# Patient Record
Sex: MALE | Race: WHITE | Employment: UNEMPLOYED | URBAN - METROPOLITAN AREA
[De-identification: names, ages, dates, MRNs, and addresses within clinical notes are randomized per-mention and may not be internally consistent; named-entity substitution may affect disease eponyms.]

---

## 2021-07-28 PROCEDURE — 88304 TISSUE EXAM BY PATHOLOGIST: CPT | Mod: TC,ORL | Performed by: PHYSICIAN ASSISTANT

## 2021-07-30 ENCOUNTER — LAB REQUISITION (OUTPATIENT)
Dept: LAB | Facility: CLINIC | Age: 41
End: 2021-07-30
Payer: COMMERCIAL

## 2021-08-02 LAB
PATH REPORT.COMMENTS IMP SPEC: NORMAL
PATH REPORT.COMMENTS IMP SPEC: NORMAL
PATH REPORT.FINAL DX SPEC: NORMAL
PATH REPORT.GROSS SPEC: NORMAL
PATH REPORT.MICROSCOPIC SPEC OTHER STN: NORMAL
PATH REPORT.RELEVANT HX SPEC: NORMAL
PHOTO IMAGE: NORMAL

## 2021-08-02 PROCEDURE — 88304 TISSUE EXAM BY PATHOLOGIST: CPT | Mod: 26 | Performed by: PATHOLOGY

## 2021-08-06 ENCOUNTER — HOSPITAL ENCOUNTER (EMERGENCY)
Facility: CLINIC | Age: 41
Discharge: HOME | End: 2021-08-06
Attending: NURSE PRACTITIONER | Admitting: NURSE PRACTITIONER
Payer: COMMERCIAL

## 2021-08-06 VITALS
BODY MASS INDEX: 26.66 KG/M2 | RESPIRATION RATE: 17 BRPM | OXYGEN SATURATION: 97 % | TEMPERATURE: 98.5 F | HEIGHT: 65 IN | DIASTOLIC BLOOD PRESSURE: 75 MMHG | WEIGHT: 160 LBS | HEART RATE: 67 BPM | SYSTOLIC BLOOD PRESSURE: 120 MMHG

## 2021-08-06 DIAGNOSIS — Z98.890 S/P HEMORRHOIDECTOMY: ICD-10-CM

## 2021-08-06 DIAGNOSIS — Z87.19 S/P HEMORRHOIDECTOMY: ICD-10-CM

## 2021-08-06 PROCEDURE — 99282 EMERGENCY DEPT VISIT SF MDM: CPT

## 2021-08-06 ASSESSMENT — ENCOUNTER SYMPTOMS
FEVER: 0
WOUND: 1

## 2021-08-06 ASSESSMENT — MIFFLIN-ST. JEOR: SCORE: 1557.64

## 2021-08-06 NOTE — ED TRIAGE NOTES
Patient reports he had a hemorrhoid removed recently, but unsure what day. He states a stitch came out this morning and he had some bleeding to area. On exam in triage room no active bleeding to area noted.

## 2021-08-06 NOTE — ED PROVIDER NOTES
"  History   Chief Complaint:  Post-op Problem       The history is provided by the patient.      Zaire Saab is a 40 year old male with history of hemorrhoid who presents with post-op problem. The patient reports he had surgery to remove a hemorrhoid. He notes that a stitch came out this morning when he wiped his bottom after using the bathroom. He denies a fever.    Review of Systems   Constitutional: Negative for fever.   Skin: Positive for wound.   All other systems reviewed and are negative.        Allergies:  Fluoxetine    Medications:  olanzapine     Past Medical History:    Drug psychosis    Past Surgical History:    Right ankle surgery    Social History:  Patient presents alone.      Physical Exam     Patient Vitals for the past 24 hrs:   BP Temp Temp src Pulse Resp SpO2 Height Weight   08/06/21 1123 120/75 98.5  F (36.9  C) Oral 67 17 97 % 1.651 m (5' 5\") 72.6 kg (160 lb)       Physical Exam  Physical Exam   Constitutional:  Standing in room, appears anxious but non-toxic.   Head: Head moves freely with normal range of motion.   Eyes: Conjunctivae pink. EOMs intact.   Neck: Normal range of motion.   Cardiovascular: Intact distal pulses: radial pulse 2+ on the right, 2+ on the left.   Pulmonary/Chest: No respiratory distress.   Rectal: No blood at the anus. There is one intact Vicryl suture. There is a superficial small opening at the left aspect of the incision, no bleeding and no tunneling.   Musculoskeletal: Moves all extremities.   Neurological: Oriented to person, place, and time. No focal deficits.           Emergency Department Course     Emergency Department Course:    Reviewed:  I reviewed nursing notes, vitals and past medical history    Assessments:  1416 I obtained history and examined the patient as noted above.     Disposition:  The patient was discharged to home.       Impression & Plan     Medical Decision Making:  Zaire Saab is a 40 year old male with a hemorrhoidectomy one " "week ago. Today he notes a stitch came out and he had some bleeding and could feel a \"hole in my skin\". Exam with a small superficial open area with no tunneling and no bleeding. One Vicryl suture remaining. We discussed that this will heal on it's own. He was given a brenda-care squeeze bottle to use after each BM and pat the perineal area dry after. We discussed reasons to return here and I encouraged him to keep his post-op follow up with colorectal surgery. He is amenable to plan.        Diagnosis:    ICD-10-CM    1. S/P hemorrhoidectomy  Z98.890     Z87.19      Scribe Disclosure:  I, Daly Bland, am serving as a scribe at 1:49 PM on 8/6/2021 to document services personally performed by Janelle Fong NP based on my observations and the provider's statements to me.            Janelle Fong APRN CNP  08/06/21 2308    "

## 2021-08-06 NOTE — DISCHARGE INSTRUCTIONS
Use the bottle to spray the area clean after BMs. Then pat dry .     Avoid wiping as this can cause irritation.     Follow up with the rectal surgeon as scheduled.    Return here for increased pain, fever or bleeding that you cannot control.